# Patient Record
Sex: MALE | Race: WHITE | NOT HISPANIC OR LATINO | Employment: FULL TIME | URBAN - METROPOLITAN AREA
[De-identification: names, ages, dates, MRNs, and addresses within clinical notes are randomized per-mention and may not be internally consistent; named-entity substitution may affect disease eponyms.]

---

## 2022-12-28 RX ORDER — FLUTICASONE PROPIONATE 50 MCG
SPRAY, SUSPENSION (ML) NASAL
COMMUNITY

## 2022-12-28 RX ORDER — CETIRIZINE HYDROCHLORIDE 10 MG/1
CAPSULE, LIQUID FILLED ORAL
COMMUNITY

## 2022-12-29 PROBLEM — M41.20 IDIOPATHIC SCOLIOSIS AND KYPHOSCOLIOSIS: Status: ACTIVE | Noted: 2019-08-19

## 2022-12-29 PROBLEM — E16.1 HYPERINSULINEMIA: Status: ACTIVE | Noted: 2021-08-20

## 2022-12-29 PROBLEM — E66.9 OBESITY: Status: RESOLVED | Noted: 2022-12-29 | Resolved: 2022-12-29

## 2022-12-29 PROBLEM — E66.9 OBESITY (BMI 30-39.9): Status: ACTIVE | Noted: 2022-12-29

## 2022-12-29 PROBLEM — E55.9 VITAMIN D DEFICIENCY: Status: ACTIVE | Noted: 2021-08-20

## 2022-12-29 PROBLEM — E78.1 HYPERTRIGLYCERIDEMIA: Status: ACTIVE | Noted: 2021-08-20

## 2022-12-29 PROBLEM — E66.9 OBESITY: Status: ACTIVE | Noted: 2022-12-29

## 2022-12-29 NOTE — PROGRESS NOTES
ADULT ANNUAL [address] IN PARTNERSHIP WITH [facility]'S    NAME: Karen Puentes  AGE: 23 y o  SEX: male  : 2000     DATE: 2022     Assessment and Plan:     Presents today to establish care    See [facility] specific charting below    Follow-up in 6 months    Problem List Items Addressed This Visit        Digestive    Hyperinsulinemia     Patient's primary concern today is his history of elevated serum insulin levels  Patient is unsure of the initial indication of why this lab was checked  Patient's BMI is elevated which may have prompted this test   Discussed with patient that his elevated insulin level does not indicate that he has diabetes but may indicate that he is at risk of developing such condition  Point-of-care A1c checked, value was 5 5  Advised patient to initiate aggressive lifestyle changes regarding his diet and exercise  Will recheck A1c value at 6-month follow-up  Relevant Orders    POCT hemoglobin A1c (Completed)       Other    Obesity (BMI 30-39  9)    Relevant Orders    POCT hemoglobin A1c (Completed)   Other Visit Diagnoses     Annual physical exam    -  Primary          Immunizations and preventive care screenings were discussed with patient today  Appropriate education was printed on patient's after visit summary  Counseling:  Exercise: the importance of regular exercise/physical activity was discussed  Recommend exercise 3-5 times per week for at least 30 minutes  BMI Counseling: Body mass index is 32 73 kg/m²  The BMI is above normal  Nutrition recommendations include decreasing fast food intake and consuming healthier snacks  Exercise recommendations include exercising 3-5 times per week  Rationale for BMI follow-up plan is due to patient being overweight or obese  Depression Screening and Follow-up Plan: Patient was screened for depression during today's encounter   They screened negative with a PHQ-2 score of 0  Return in about 6 months (around 6/30/2023) for Annual physical      Chief Complaint:     Chief Complaint   Patient presents with   • Establish Care      History of Present Illness:     Adult Annual Physical   Patient here for a comprehensive physical exam  The patient reports no problems  Diet and Physical Activity  Diet/Nutrition: unknown  Exercise: no formal exercise  Depression Screening  PHQ-2/9 Depression Screening    Little interest or pleasure in doing things: 0 - not at all  Feeling down, depressed, or hopeless: 0 - not at all  PHQ-2 Score: 0  PHQ-2 Interpretation: Negative depression screen       General Health  Sleep: sleeps well  Hearing: normal - bilateral   Vision: wears glasses  Dental: regular dental visits   Health  History of STDs?: no      Review of Systems:     Review of Systems   Constitutional: Negative  HENT: Negative  Eyes: Negative  Respiratory: Negative  Cardiovascular: Negative  Gastrointestinal: Negative  Endocrine: Negative  Genitourinary: Negative  Musculoskeletal: Negative  Skin: Negative  Allergic/Immunologic: Negative  Neurological: Negative  Hematological: Negative  Psychiatric/Behavioral: Negative  Past Medical History:     History reviewed  No pertinent past medical history     Past Surgical History:     Past Surgical History:   Procedure Laterality Date   • FRACTURE SURGERY  August 2021      Social History:     Social History     Socioeconomic History   • Marital status: Single     Spouse name: None   • Number of children: None   • Years of education: None   • Highest education level: None   Occupational History   • None   Tobacco Use   • Smoking status: Never   • Smokeless tobacco: Never   Substance and Sexual Activity   • Alcohol use: Never   • Drug use: Never   • Sexual activity: Never   Other Topics Concern   • None   Social History Narrative   • None     Social Determinants of Health     Financial Resource Strain: Not on file   Food Insecurity: Not on file   Transportation Needs: Not on file   Physical Activity: Not on file   Stress: Not on file   Social Connections: Not on file   Intimate Partner Violence: Not on file   Housing Stability: Not on file      Family History:     Family History   Problem Relation Age of Onset   • Gestational diabetes Mother       Current Medications:     Current Outpatient Medications   Medication Sig Dispense Refill   • Cetirizine HCl (ZyrTEC Allergy) 10 MG CAPS as needed  • fluticasone (FLONASE) 50 mcg/act nasal spray as needed  No current facility-administered medications for this visit  Allergies: Allergies   Allergen Reactions   • Peanut Oil - Food Allergy Nausea Only     Sensitivity      Physical Exam:     /84 (BP Location: Left arm, Patient Position: Sitting)   Pulse (!) 118   Temp 99 4 °F (37 4 °C)   Ht 6' 5" (1 956 m)   Wt 125 kg (276 lb)   SpO2 98%   BMI 32 73 kg/m²     Physical Exam  Vitals and nursing note reviewed  Constitutional:       Appearance: Normal appearance  He is well-developed  He is obese  HENT:      Head: Normocephalic and atraumatic  Right Ear: Tympanic membrane normal  There is no impacted cerumen  Left Ear: Tympanic membrane normal  There is no impacted cerumen  Nose: Nose normal  No congestion  Mouth/Throat:      Mouth: Mucous membranes are moist       Pharynx: Oropharynx is clear  No oropharyngeal exudate or posterior oropharyngeal erythema  Eyes:      Extraocular Movements: Extraocular movements intact  Conjunctiva/sclera: Conjunctivae normal       Pupils: Pupils are equal, round, and reactive to light  Cardiovascular:      Rate and Rhythm: Normal rate and regular rhythm  Pulses: Normal pulses  Heart sounds: Normal heart sounds  No murmur heard  Pulmonary:      Effort: Pulmonary effort is normal  No respiratory distress        Breath sounds: Normal breath sounds  No wheezing  Abdominal:      General: Bowel sounds are normal       Palpations: Abdomen is soft  Tenderness: There is no abdominal tenderness  Musculoskeletal:         General: No swelling, tenderness, deformity or signs of injury  Normal range of motion  Cervical back: Normal range of motion and neck supple  Right lower leg: No edema  Left lower leg: No edema  Lymphadenopathy:      Cervical: No cervical adenopathy  Skin:     General: Skin is warm and dry  Capillary Refill: Capillary refill takes less than 2 seconds  Findings: No rash  Neurological:      General: No focal deficit present  Mental Status: He is alert and oriented to person, place, and time  Mental status is at baseline  Cranial Nerves: No cranial nerve deficit  Sensory: No sensory deficit  Motor: No weakness        Coordination: Coordination normal       Gait: Gait normal       Deep Tendon Reflexes: Reflexes normal    Psychiatric:         Mood and Affect: Mood normal          Behavior: Behavior normal           Tee Rivera, [address]

## 2022-12-29 NOTE — PATIENT INSTRUCTIONS
Wellness Visit for Adults   AMBULATORY CARE:   A wellness visit  is when you see your healthcare provider to get screened for health problems  Your healthcare provider will also give you advice on how to stay healthy  Write down your questions so you remember to ask them  Ask your healthcare provider how often you should have a wellness visit  What happens at a wellness visit:  Your healthcare provider will ask about your health, and your family history of health problems  This includes high blood pressure, heart disease, and cancer  He or she will ask if you have symptoms that concern you, if you smoke, and about your mood  You may also be asked about your intake of medicines, supplements, food, and alcohol  Any of the following may be done: Your weight  will be checked  Your height may also be checked so your body mass index (BMI) can be calculated  Your BMI shows if you are at a healthy weight  Your blood pressure  and heart rate will be checked  Your temperature may also be checked  Blood and urine tests  may be done  Blood tests may be done to check your cholesterol levels  Abnormal cholesterol levels increase your risk for heart disease and stroke  You may also need a blood or urine test to check for diabetes if you are at increased risk  Urine tests may be done to look for signs of an infection or kidney disease  A physical exam  includes checking your heartbeat and lungs with a stethoscope  Your healthcare provider may also check your skin to look for sun damage  Screening tests  may be recommended  A screening test is done to check for diseases that may not cause symptoms  The screening tests you may need depend on your age, gender, family history, and lifestyle habits  For example, colorectal screening may be recommended if you are 48years old or older  Screening tests you need if you are a woman:   A Pap smear  is used to screen for cervical cancer   Pap smears are usually done every 3 to 5 years depending on your age  You may need them more often if you have had abnormal Pap smear test results in the past  Ask your healthcare provider how often you should have a Pap smear  A mammogram  is an x-ray of your breasts to screen for breast cancer  Experts recommend mammograms every 2 years starting at age 48 years  You may need a mammogram at age 52 years or younger if you have an increased risk for breast cancer  Talk to your healthcare provider about when you should start having mammograms and how often you need them  Vaccines you may need:   Get an influenza vaccine  every year  The influenza vaccine protects you from the flu  Several types of viruses cause the flu  The viruses change over time, so new vaccines are made each year  Get a tetanus-diphtheria (Td) booster vaccine  every 10 years  This vaccine protects you against tetanus and diphtheria  Tetanus is a severe infection that may cause painful muscle spasms and lockjaw  Diphtheria is a severe bacterial infection that causes a thick covering in the back of your mouth and throat  Get a human papillomavirus (HPV) vaccine  if you are female and aged 23 to 32 or male 23 to 24 and never received it  This vaccine protects you from HPV infection  HPV is the most common infection spread by sexual contact  HPV may also cause vaginal, penile, and anal cancers  Get a pneumococcal vaccine  if you are aged 72 years or older  The pneumococcal vaccine is an injection given to protect you from pneumococcal disease  Pneumococcal disease is an infection caused by pneumococcal bacteria  The infection may cause pneumonia, meningitis, or an ear infection  Get a shingles vaccine  if you are 60 or older, even if you have had shingles before  The shingles vaccine is an injection to protect you from the varicella-zoster virus  This is the same virus that causes chickenpox   Shingles is a painful rash that develops in people who had chickenpox or have been exposed to the virus  How to eat healthy:  My Plate is a model for planning healthy meals  It shows the types and amounts of foods that should go on your plate  Fruits and vegetables make up about half of your plate, and grains and protein make up the other half  A serving of dairy is included on the side of your plate  The amount of calories and serving sizes you need depends on your age, gender, weight, and height  Examples of healthy foods are listed below:  Eat a variety of vegetables  such as dark green, red, and orange vegetables  You can also include canned vegetables low in sodium (salt) and frozen vegetables without added butter or sauces  Eat a variety of fresh fruits , canned fruit in 100% juice, frozen fruit, and dried fruit  Include whole grains  At least half of the grains you eat should be whole grains  Examples include whole-wheat bread, wheat pasta, brown rice, and whole-grain cereals such as oatmeal     Eat a variety of protein foods such as seafood (fish and shellfish), lean meat, and poultry without skin (turkey and chicken)  Examples of lean meats include pork leg, shoulder, or tenderloin, and beef round, sirloin, tenderloin, and extra lean ground beef  Other protein foods include eggs and egg substitutes, beans, peas, soy products, nuts, and seeds  Choose low-fat dairy products such as skim or 1% milk or low-fat yogurt, cheese, and cottage cheese  Limit unhealthy fats  such as butter, hard margarine, and shortening  Exercise:  Exercise at least 30 minutes per day on most days of the week  Some examples of exercise include walking, biking, dancing, and swimming  You can also fit in more physical activity by taking the stairs instead of the elevator or parking farther away from stores  Include muscle strengthening activities 2 days each week  Regular exercise provides many health benefits   It helps you manage your weight, and decreases your risk for type 2 diabetes, heart disease, stroke, and high blood pressure  Exercise can also help improve your mood  Ask your healthcare provider about the best exercise plan for you  General health and safety guidelines:   Do not smoke  Nicotine and other chemicals in cigarettes and cigars can cause lung damage  Ask your healthcare provider for information if you currently smoke and need help to quit  E-cigarettes or smokeless tobacco still contain nicotine  Talk to your healthcare provider before you use these products  Limit alcohol  A drink of alcohol is 12 ounces of beer, 5 ounces of wine, or 1½ ounces of liquor  Lose weight, if needed  Being overweight increases your risk of certain health conditions  These include heart disease, high blood pressure, type 2 diabetes, and certain types of cancer  Protect your skin  Do not sunbathe or use tanning beds  Use sunscreen with a SPF 15 or higher  Apply sunscreen at least 15 minutes before you go outside  Reapply sunscreen every 2 hours  Wear protective clothing, hats, and sunglasses when you are outside  Drive safely  Always wear your seatbelt  Make sure everyone in your car wears a seatbelt  A seatbelt can save your life if you are in an accident  Do not use your cell phone when you are driving  This could distract you and cause an accident  Pull over if you need to make a call or send a text message  Practice safe sex  Use latex condoms if are sexually active and have more than one partner  Your healthcare provider may recommend screening tests for sexually transmitted infections (STIs)  Wear helmets, lifejackets, and protective gear  Always wear a helmet when you ride a bike or motorcycle, go skiing, or play sports that could cause a head injury  Wear protective equipment when you play sports  Wear a lifejacket when you are on a boat or doing water sports      © Copyright [employer] 2022 Information is for End User's use only and may not be sold, redistributed or otherwise used for commercial purposes  All illustrations and images included in CareNotes® are the copyrighted property of A D A M , Inc  or Madi Alvarez  The above information is an  only  It is not intended as medical advice for individual conditions or treatments  Talk to your doctor, nurse or pharmacist before following any medical regimen to see if it is safe and effective for you

## 2022-12-30 ENCOUNTER — OFFICE VISIT (OUTPATIENT)
Dept: FAMILY MEDICINE CLINIC | Facility: CLINIC | Age: 22
End: 2022-12-30

## 2022-12-30 VITALS
HEART RATE: 118 BPM | TEMPERATURE: 99.4 F | DIASTOLIC BLOOD PRESSURE: 84 MMHG | SYSTOLIC BLOOD PRESSURE: 126 MMHG | HEIGHT: 77 IN | WEIGHT: 276 LBS | OXYGEN SATURATION: 98 % | BODY MASS INDEX: 32.59 KG/M2

## 2022-12-30 DIAGNOSIS — E16.1 HYPERINSULINEMIA: ICD-10-CM

## 2022-12-30 DIAGNOSIS — Z00.00 ANNUAL PHYSICAL EXAM: Primary | ICD-10-CM

## 2022-12-30 DIAGNOSIS — E66.9 OBESITY (BMI 30-39.9): ICD-10-CM

## 2022-12-30 LAB — SL AMB POCT HEMOGLOBIN AIC: 5.5 (ref ?–6.5)

## 2022-12-30 NOTE — ASSESSMENT & PLAN NOTE
Patient's primary concern today is his history of elevated serum insulin levels  Patient is unsure of the initial indication of why this lab was checked  Patient's BMI is elevated which may have prompted this test   Discussed with patient that his elevated insulin level does not indicate that he has diabetes but may indicate that he is at risk of developing such condition  Point-of-care A1c checked, value was 5 5  Advised patient to initiate aggressive lifestyle changes regarding his diet and exercise  Will recheck A1c value at 6-month follow-up

## 2023-07-05 ENCOUNTER — RA CDI HCC (OUTPATIENT)
Dept: OTHER | Facility: HOSPITAL | Age: 23
End: 2023-07-05

## 2023-07-05 NOTE — PROGRESS NOTES
Assessment/Plan:    Hyperinsulinemia  History of high insulin on his lab work. He had an A1c that was 5.5 at last visit. Check of A1c today is 5.6. I will order additional insulin testing. I will also refer patient over to care management to work on diet and nutrition. He is very young to have his elevated BMI and also he is almost prediabetic. Diagnoses and all orders for this visit:    Hyperinsulinemia  -     Insulin, random; Future  -     Proinsulin; Future  -     C-peptide; Future  -     Ambulatory referral to complex care management program; Future  -     POCT hemoglobin A1c    Hypertriglyceridemia  -     Ambulatory referral to complex care management program; Future  -     POCT hemoglobin A1c    Obesity (BMI 30-39.9)  -     POCT hemoglobin A1c    Need for hepatitis C screening test  -     Hepatitis C Ab W/Refl To HCV RNA, Qn, PCR ([facility]); Future    Screening for HIV (human immunodeficiency virus)  -     HIV 1/2 Antigen/Antibody (Fourth Generation) with Reflex Testing ([facility], [facility], or EXTERNAL LAB); Future    Vitamin D deficiency  -     Vitamin D 25 hydroxy; Future    Other orders  -     doxycycline hyclate (VIBRA-TABS) 100 mg tablet; Take 100 mg by mouth  -     Discontinue: ketorolac (TORADOL) 10 mg tablet; TAKE ONE TABLET BY MOUTH EVERY 6 HOURS FOR 5 DAYS  -     oxyCODONE (ROXICODONE) 5 immediate release tablet; Take 5 mg by mouth every 6 (six) hours as needed  -     saccharomyces boulardii (FLORASTOR) 250 mg capsule; Take 250 mg by mouth in the morning          Patient is a 44-year-old male who is here to transition care today. We will order lab work to follow-up on his insulin levels. I will refer patient to care management to work on diet and nutrition due to his almost prediabetes status and previous high insulin levels with elevated BMI. I will follow-up with patient in about 3 months to recheck his weight and have another follow-up on his A1c.   We will notify patient of lab results through [address] [place] [address]. 20 minutes spent on physical exam and plan of care. This note was dictated using software. Subjective:      Patient ID: Loraine Palacios is a 26 y.o. male. HPI    The following portions of the patient's history were reviewed and updated as appropriate: allergies, current medications, past family history, past medical history, past social history, past surgical history and problem list.    Patient is a 63-year-old male who is here for transition of care today. He is here for laboratory check and has a history of high insulin and high triglycerides. He is here for follow-up on his lab work to have A1c checked and follow insulin levels. He also is recently in a boot in his left foot. He had hardware malfunction in his left ankle and had to have some hardware removed. He is currently on doxycycline to prevent infection to his bone and he is taking a probiotic daily for his gut while he is on antibiotics. Review of Systems   Respiratory: Negative for shortness of breath. Cardiovascular: Negative for chest pain. Gastrointestinal: Negative for abdominal pain, constipation and diarrhea. Genitourinary: Negative for difficulty urinating. Musculoskeletal: Positive for arthralgias. Foot in boot   Skin: Negative for rash. Objective:      /79 (BP Location: Right arm, Patient Position: Sitting, Cuff Size: Large)   Pulse (!) 106   Ht 6' 5" (1.956 m)   Wt 129 kg (285 lb 3.2 oz)   BMI 33.82 kg/m²          Physical Exam  Vitals and nursing note reviewed. Constitutional:       General: He is not in acute distress. Appearance: Normal appearance. He is obese. HENT:      Head: Normocephalic and atraumatic. Right Ear: External ear normal.      Left Ear: External ear normal.      Nose: Nose normal.      Mouth/Throat:      Mouth: Mucous membranes are moist.      Pharynx: Oropharynx is clear. Eyes:      Extraocular Movements: Extraocular movements intact. Conjunctiva/sclera: Conjunctivae normal.      Pupils: Pupils are equal, round, and reactive to light. Cardiovascular:      Rate and Rhythm: Normal rate and regular rhythm. Heart sounds: Normal heart sounds. No murmur heard. No friction rub. No gallop. Pulmonary:      Effort: Pulmonary effort is normal. No respiratory distress. Breath sounds: Normal breath sounds. No wheezing. Abdominal:      General: Abdomen is flat. Bowel sounds are normal.      Palpations: Abdomen is soft. Musculoskeletal:         General: Normal range of motion. Cervical back: Normal range of motion. Skin:     General: Skin is warm and dry. Findings: No rash. Neurological:      General: No focal deficit present. Mental Status: He is alert and oriented to person, place, and time. Mental status is at baseline. Psychiatric:         Mood and Affect: Mood normal.         Behavior: Behavior normal.         Thought Content:  Thought content normal.         Judgment: Judgment normal.

## 2023-07-05 NOTE — ASSESSMENT & PLAN NOTE
History of high insulin on his lab work. He had an A1c that was 5.5 at last visit. Check of A1c today is 5.6. I will order additional insulin testing. I will also refer patient over to care management to work on diet and nutrition. He is very young to have his elevated BMI and also he is almost prediabetic.

## 2023-07-05 NOTE — PROGRESS NOTES
[address] [facility] coding opportunities       Chart reviewed, no opportunity found: CHART REVIEWED, NO OPPORTUNITY FOUND        Patients Insurance        Commercial Insurance: Brad Lees

## 2023-07-11 RX ORDER — DOXYCYCLINE HYCLATE 100 MG
100 TABLET ORAL
COMMUNITY
Start: 2023-07-06 | End: 2023-08-03

## 2023-07-12 ENCOUNTER — OFFICE VISIT (OUTPATIENT)
Dept: FAMILY MEDICINE CLINIC | Facility: CLINIC | Age: 23
End: 2023-07-12

## 2023-07-12 VITALS
SYSTOLIC BLOOD PRESSURE: 122 MMHG | HEIGHT: 77 IN | DIASTOLIC BLOOD PRESSURE: 79 MMHG | HEART RATE: 106 BPM | BODY MASS INDEX: 33.67 KG/M2 | WEIGHT: 285.2 LBS

## 2023-07-12 DIAGNOSIS — E78.1 HYPERTRIGLYCERIDEMIA: ICD-10-CM

## 2023-07-12 DIAGNOSIS — Z11.59 NEED FOR HEPATITIS C SCREENING TEST: ICD-10-CM

## 2023-07-12 DIAGNOSIS — Z11.4 SCREENING FOR HIV (HUMAN IMMUNODEFICIENCY VIRUS): ICD-10-CM

## 2023-07-12 DIAGNOSIS — E55.9 VITAMIN D DEFICIENCY: ICD-10-CM

## 2023-07-12 DIAGNOSIS — E66.9 OBESITY (BMI 30-39.9): ICD-10-CM

## 2023-07-12 DIAGNOSIS — E16.1 HYPERINSULINEMIA: Primary | ICD-10-CM

## 2023-07-12 LAB — SL AMB POCT HEMOGLOBIN AIC: 5.6 (ref ?–6.5)

## 2023-07-12 PROCEDURE — 83036 HEMOGLOBIN GLYCOSYLATED A1C: CPT | Performed by: STUDENT IN AN ORGANIZED HEALTH CARE EDUCATION/TRAINING PROGRAM

## 2023-07-12 PROCEDURE — 99213 OFFICE O/P EST LOW 20 MIN: CPT | Performed by: STUDENT IN AN ORGANIZED HEALTH CARE EDUCATION/TRAINING PROGRAM

## 2023-07-12 RX ORDER — KETOROLAC TROMETHAMINE 10 MG/1
TABLET, FILM COATED ORAL
COMMUNITY
Start: 2023-06-29 | End: 2023-07-12 | Stop reason: ALTCHOICE

## 2023-07-12 RX ORDER — OXYCODONE HYDROCHLORIDE 5 MG/1
5 TABLET ORAL EVERY 6 HOURS PRN
COMMUNITY
Start: 2023-06-29

## 2023-07-12 RX ORDER — SACCHAROMYCES BOULARDII 250 MG
250 CAPSULE ORAL DAILY
COMMUNITY

## 2023-07-12 NOTE — LETTER
July 12, 2023     [address] [id number]    Patient: [facility]   YOB: 2000   Date of Visit: 7/12/2023       To whom it may concern,    [facility] is a patient under my care. He recently had a procedure to remove hardware from his left ankle and is currently undergoing long-term antibiotic treatment to prevent infection of the bone. While he is on antibiotics he is taking a probiotic for his bowel health. It is medically necessary for him to be on this probiotic while undergoing antibiotic therapy. I request the probiotic be covered his flex. Dx: left ankle hardware removal    If you have questions, please do not hesitate to call me.          Sincerely,        Edgard Sykes DO        CC: No Recipients

## 2023-07-17 ENCOUNTER — PATIENT OUTREACH (OUTPATIENT)
Age: 23
End: 2023-07-17

## 2023-07-17 NOTE — PROGRESS NOTES
Called Patient to introduce them to care management program. No answer, voicemail left with callback information. My Chart message sent as well with request to respond with best day and time for outreach. Will reach out again if no return response.

## 2023-07-20 LAB
25(OH)D3 SERPL-MCNC: 28 NG/ML (ref 30–100)
C PEPTIDE SERPL-MCNC: 4.61 NG/ML (ref 0.8–3.85)
HCV AB SERPL QL IA: NORMAL
HIV 1+2 AB+HIV1 P24 AG SERPL QL IA: NORMAL
INSULIN SERPL-ACNC: 41.2 UIU/ML

## 2023-07-27 LAB
25(OH)D3 SERPL-MCNC: 28 NG/ML (ref 30–100)
C PEPTIDE SERPL-MCNC: 4.61 NG/ML (ref 0.8–3.85)
HCV AB SERPL QL IA: NORMAL
HIV 1+2 AB+HIV1 P24 AG SERPL QL IA: NORMAL
INSULIN SERPL-ACNC: 41.2 UIU/ML
PROINSULIN SERPL-SCNC: 14.3 PMOL/L

## 2023-07-31 ENCOUNTER — PATIENT OUTREACH (OUTPATIENT)
Age: 23
End: 2023-07-31

## 2023-07-31 NOTE — PROGRESS NOTES
Spoke with patient during scheduled outreach. I provided a general overview of the program and patient consented to participation. Patient had a screw removed from his ankle that was protruding and has a slow healing wound. The surgeon suggested a skin graft to speed up healing since driving weekly to appointments is a hassle. I recommended mepilex product which would aid greatly in healing the wound. I found a generic brand for the patient at [facility]. Patient drinks about 60oz of water a day. I suggested boosting to 80oz. I described the process of calorie tracking and asked that he send me the # on Sunday evening through [employer]. 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 He is on a strict budget for food. Patient verbalized understanding. Next phone outreach 8/21.

## 2023-08-07 ENCOUNTER — PATIENT OUTREACH (OUTPATIENT)
Age: 23
End: 2023-08-07

## 2023-08-07 NOTE — PROGRESS NOTES
Patient sent daily calories via [employer] as requested. His daily calories range from 3433-9669. I set initial goal at 0438-3445, but gave him the option to go to 2100 if this is hard to achieve.  Next phone outreach 8/21

## 2023-08-21 ENCOUNTER — PATIENT OUTREACH (OUTPATIENT)
Age: 23
End: 2023-08-21

## 2023-08-21 NOTE — PROGRESS NOTES
Spoke with patient during scheduled outreach. He was around 9212-3020 instead of the [id number]. He found that he was very hungry for snacks. He did not feel it was due to boredom or seeing snacks, but that he actually felt hungry. We looked at his macros and he was short on protein. We talked about ways to boost protein. At this point I suggested working on hitting the 6966-3249 calories and balancing his macros. Macro pictogram sent.  Next phone outreach 9/18

## 2023-09-18 ENCOUNTER — PATIENT OUTREACH (OUTPATIENT)
Age: 23
End: 2023-09-18

## 2023-10-17 ENCOUNTER — PATIENT OUTREACH (OUTPATIENT)
Age: 23
End: 2023-10-17

## 2023-10-17 NOTE — PROGRESS NOTES
Chart Review. Patient did not answer scheduled outreach on 9/18 nor did he reschedule. Last connected outreach was 8/21. My Chart letter sent letting patient know case is closed due to inactivity.

## 2023-10-17 NOTE — LETTER
Date: 10/17/23    Dear Lucy Aaron,   We have not had an outreach since 8/21. I have made attempts to contact you without a response. At this time your case with Care Management is considered inactive and will be closed.  If you find at a later date that you have the time to participate in the program, please reach out to your provider for another referral.  Sincerely,  Carmella Tavarez MSN RN  [phone]  Outpatient Care Manager

## 2025-07-22 ENCOUNTER — TELEPHONE (OUTPATIENT)
Age: 25
End: 2025-07-22